# Patient Record
Sex: MALE | Race: WHITE | NOT HISPANIC OR LATINO | URBAN - METROPOLITAN AREA
[De-identification: names, ages, dates, MRNs, and addresses within clinical notes are randomized per-mention and may not be internally consistent; named-entity substitution may affect disease eponyms.]

---

## 2018-03-05 ENCOUNTER — EMERGENCY (EMERGENCY)
Facility: HOSPITAL | Age: 63
LOS: 1 days | Discharge: HOME | End: 2018-03-05
Attending: EMERGENCY MEDICINE | Admitting: EMERGENCY MEDICINE
Payer: COMMERCIAL

## 2018-03-05 VITALS
OXYGEN SATURATION: 95 % | SYSTOLIC BLOOD PRESSURE: 168 MMHG | HEART RATE: 88 BPM | WEIGHT: 315 LBS | HEIGHT: 70 IN | DIASTOLIC BLOOD PRESSURE: 103 MMHG | RESPIRATION RATE: 22 BRPM | TEMPERATURE: 99 F

## 2018-03-05 VITALS
OXYGEN SATURATION: 100 % | RESPIRATION RATE: 19 BRPM | TEMPERATURE: 98 F | DIASTOLIC BLOOD PRESSURE: 89 MMHG | HEART RATE: 77 BPM | SYSTOLIC BLOOD PRESSURE: 133 MMHG

## 2018-03-05 LAB
ALBUMIN SERPL ELPH-MCNC: 4.3 G/DL — SIGNIFICANT CHANGE UP (ref 3.3–5)
ALP SERPL-CCNC: 71 U/L — SIGNIFICANT CHANGE UP (ref 40–120)
ALT FLD-CCNC: 35 U/L RC — SIGNIFICANT CHANGE UP (ref 10–45)
ANION GAP SERPL CALC-SCNC: 13 MMOL/L — SIGNIFICANT CHANGE UP (ref 5–17)
AST SERPL-CCNC: 33 U/L — SIGNIFICANT CHANGE UP (ref 10–40)
BASOPHILS # BLD AUTO: 0.1 K/UL — SIGNIFICANT CHANGE UP (ref 0–0.2)
BASOPHILS NFR BLD AUTO: 0.5 % — SIGNIFICANT CHANGE UP (ref 0–2)
BILIRUB SERPL-MCNC: 0.5 MG/DL — SIGNIFICANT CHANGE UP (ref 0.2–1.2)
BUN SERPL-MCNC: 12 MG/DL — SIGNIFICANT CHANGE UP (ref 7–23)
CALCIUM SERPL-MCNC: 9.3 MG/DL — SIGNIFICANT CHANGE UP (ref 8.4–10.5)
CHLORIDE SERPL-SCNC: 103 MMOL/L — SIGNIFICANT CHANGE UP (ref 96–108)
CK MB BLD-MCNC: 1.6 % — SIGNIFICANT CHANGE UP (ref 0–3.5)
CK MB CFR SERPL CALC: 2.3 NG/ML — SIGNIFICANT CHANGE UP (ref 0–6.7)
CK SERPL-CCNC: 147 U/L — SIGNIFICANT CHANGE UP (ref 30–200)
CO2 SERPL-SCNC: 23 MMOL/L — SIGNIFICANT CHANGE UP (ref 22–31)
CREAT SERPL-MCNC: 0.82 MG/DL — SIGNIFICANT CHANGE UP (ref 0.5–1.3)
EOSINOPHIL # BLD AUTO: 0.1 K/UL — SIGNIFICANT CHANGE UP (ref 0–0.5)
EOSINOPHIL NFR BLD AUTO: 1.2 % — SIGNIFICANT CHANGE UP (ref 0–6)
GAS PNL BLDV: SIGNIFICANT CHANGE UP
GLUCOSE SERPL-MCNC: 155 MG/DL — HIGH (ref 70–99)
HCT VFR BLD CALC: 44.4 % — SIGNIFICANT CHANGE UP (ref 39–50)
HGB BLD-MCNC: 15.4 G/DL — SIGNIFICANT CHANGE UP (ref 13–17)
LYMPHOCYTES # BLD AUTO: 2.3 K/UL — SIGNIFICANT CHANGE UP (ref 1–3.3)
LYMPHOCYTES # BLD AUTO: 24.9 % — SIGNIFICANT CHANGE UP (ref 13–44)
MCHC RBC-ENTMCNC: 31.6 PG — SIGNIFICANT CHANGE UP (ref 27–34)
MCHC RBC-ENTMCNC: 34.7 GM/DL — SIGNIFICANT CHANGE UP (ref 32–36)
MCV RBC AUTO: 90.9 FL — SIGNIFICANT CHANGE UP (ref 80–100)
MONOCYTES # BLD AUTO: 0.7 K/UL — SIGNIFICANT CHANGE UP (ref 0–0.9)
MONOCYTES NFR BLD AUTO: 7.7 % — SIGNIFICANT CHANGE UP (ref 2–14)
NEUTROPHILS # BLD AUTO: 6.2 K/UL — SIGNIFICANT CHANGE UP (ref 1.8–7.4)
NEUTROPHILS NFR BLD AUTO: 65.7 % — SIGNIFICANT CHANGE UP (ref 43–77)
NT-PROBNP SERPL-SCNC: 96 PG/ML — SIGNIFICANT CHANGE UP (ref 0–300)
PLATELET # BLD AUTO: 256 K/UL — SIGNIFICANT CHANGE UP (ref 150–400)
POTASSIUM SERPL-MCNC: 4 MMOL/L — SIGNIFICANT CHANGE UP (ref 3.5–5.3)
POTASSIUM SERPL-SCNC: 4 MMOL/L — SIGNIFICANT CHANGE UP (ref 3.5–5.3)
PROT SERPL-MCNC: 7.7 G/DL — SIGNIFICANT CHANGE UP (ref 6–8.3)
RBC # BLD: 4.88 M/UL — SIGNIFICANT CHANGE UP (ref 4.2–5.8)
RBC # FLD: 12.1 % — SIGNIFICANT CHANGE UP (ref 10.3–14.5)
SODIUM SERPL-SCNC: 139 MMOL/L — SIGNIFICANT CHANGE UP (ref 135–145)
TROPONIN T SERPL-MCNC: <0.01 NG/ML — SIGNIFICANT CHANGE UP (ref 0–0.06)
TROPONIN T SERPL-MCNC: <0.01 NG/ML — SIGNIFICANT CHANGE UP (ref 0–0.06)
WBC # BLD: 9.4 K/UL — SIGNIFICANT CHANGE UP (ref 3.8–10.5)
WBC # FLD AUTO: 9.4 K/UL — SIGNIFICANT CHANGE UP (ref 3.8–10.5)

## 2018-03-05 PROCEDURE — 84295 ASSAY OF SERUM SODIUM: CPT

## 2018-03-05 PROCEDURE — 82553 CREATINE MB FRACTION: CPT

## 2018-03-05 PROCEDURE — 99053 MED SERV 10PM-8AM 24 HR FAC: CPT

## 2018-03-05 PROCEDURE — 84484 ASSAY OF TROPONIN QUANT: CPT

## 2018-03-05 PROCEDURE — 71046 X-RAY EXAM CHEST 2 VIEWS: CPT | Mod: 26

## 2018-03-05 PROCEDURE — 83880 ASSAY OF NATRIURETIC PEPTIDE: CPT

## 2018-03-05 PROCEDURE — 94640 AIRWAY INHALATION TREATMENT: CPT

## 2018-03-05 PROCEDURE — 71046 X-RAY EXAM CHEST 2 VIEWS: CPT

## 2018-03-05 PROCEDURE — 82550 ASSAY OF CK (CPK): CPT

## 2018-03-05 PROCEDURE — 99284 EMERGENCY DEPT VISIT MOD MDM: CPT | Mod: 25

## 2018-03-05 PROCEDURE — 82803 BLOOD GASES ANY COMBINATION: CPT

## 2018-03-05 PROCEDURE — 83605 ASSAY OF LACTIC ACID: CPT

## 2018-03-05 PROCEDURE — 82435 ASSAY OF BLOOD CHLORIDE: CPT

## 2018-03-05 PROCEDURE — 99285 EMERGENCY DEPT VISIT HI MDM: CPT | Mod: 25

## 2018-03-05 PROCEDURE — 80053 COMPREHEN METABOLIC PANEL: CPT

## 2018-03-05 PROCEDURE — 82947 ASSAY GLUCOSE BLOOD QUANT: CPT

## 2018-03-05 PROCEDURE — 93010 ELECTROCARDIOGRAM REPORT: CPT

## 2018-03-05 PROCEDURE — 96374 THER/PROPH/DIAG INJ IV PUSH: CPT

## 2018-03-05 PROCEDURE — 84132 ASSAY OF SERUM POTASSIUM: CPT

## 2018-03-05 PROCEDURE — 82330 ASSAY OF CALCIUM: CPT

## 2018-03-05 PROCEDURE — 85027 COMPLETE CBC AUTOMATED: CPT

## 2018-03-05 PROCEDURE — 93005 ELECTROCARDIOGRAM TRACING: CPT

## 2018-03-05 PROCEDURE — 85014 HEMATOCRIT: CPT

## 2018-03-05 RX ORDER — IPRATROPIUM/ALBUTEROL SULFATE 18-103MCG
3 AEROSOL WITH ADAPTER (GRAM) INHALATION ONCE
Qty: 0 | Refills: 0 | Status: COMPLETED | OUTPATIENT
Start: 2018-03-05 | End: 2018-03-05

## 2018-03-05 RX ORDER — TIOTROPIUM BROMIDE 18 UG/1
0 CAPSULE ORAL; RESPIRATORY (INHALATION)
Qty: 0 | Refills: 0 | COMMUNITY

## 2018-03-05 RX ORDER — FLUTICASONE PROPIONATE AND SALMETEROL 50; 250 UG/1; UG/1
0 POWDER ORAL; RESPIRATORY (INHALATION)
Qty: 0 | Refills: 0 | COMMUNITY

## 2018-03-05 RX ORDER — ASPIRIN/CALCIUM CARB/MAGNESIUM 324 MG
162 TABLET ORAL DAILY
Qty: 0 | Refills: 0 | Status: DISCONTINUED | OUTPATIENT
Start: 2018-03-05 | End: 2018-03-09

## 2018-03-05 RX ADMIN — Medication 3 MILLILITER(S): at 06:11

## 2018-03-05 RX ADMIN — Medication 162 MILLIGRAM(S): at 03:03

## 2018-03-05 RX ADMIN — Medication 125 MILLIGRAM(S): at 06:11

## 2018-03-05 RX ADMIN — Medication 3 MILLILITER(S): at 03:03

## 2018-03-05 NOTE — ED PROVIDER NOTE - PROGRESS NOTE DETAILS
PT seen and reassessed.  Patient symptomatically improved.   AAOX3, NAD, VSS.  Discussed test results w/ patient, given copy of results. Patient verbalized understanding of hospital course and outpatient plans, has decisional making capacity. Discussed possibiity of new onset Heart Failure with patient and possible need for echo vs stress test. patient does not wish to stay and requests close outpatient follow up. he understands the risk of deterioration including worsening shortness of breath or death and wishes to go. has decision making capacity and full understanding.

## 2018-03-05 NOTE — ED PROVIDER NOTE - ATTENDING CONTRIBUTION TO CARE
I have examined and evaluated this patient with the above resident or PA, and agree with the documented clinical history, exam and plan.   Briefly: 62yo M with h/o Asthma, Chronic bronchitis 2/2 asbestos exposure, HTN, presenting with acute on chronic dyspnea, worse with exertion/activity.  On exam, afebrile, in NAD, obese male; speaking in full sentences, no respiratory distress.  Lungs have diffuse expiratory wheezing.  Cardiac: nrl s1/s2 RRR no MGR.  Abd soft nt/nd.  No peripheral edema.  No rash.      ECG NSR without ischemic changes.  CXR obtained, no consolidation or ptx.  Labs obtained, trop and pro-bnp negative.  Likely dc with nebs, steroids if repeat trop negative.

## 2018-03-05 NOTE — ED PROVIDER NOTE - PLAN OF CARE
1) Follow up with Cardiology in the next 2-5 days. Call today for an appointment.   2) Return to the ER immediately for new or worsening symptoms including worsening shortness of breath, chest pain or other issuess.   3) Take the steroid pack as prescribed .  4) Use your home albuterol every 4 hours as needed for wheezing or shortness of breath.   5) Continue your other home medications.

## 2018-03-05 NOTE — ED ADULT NURSE NOTE - OBJECTIVE STATEMENT
63 year old male alert and oriented x 4 came to the ED c/o shortness of breath for the past 2-3 days which got worse today.  Patient says shortness of breath si worse when laying flat and he started taking an albuterol nebulizer yesterday and today, last dose 2230 tonight which didn't really help his symptoms.  Patient denies; chest pain, fevers, chills, nausea, vomiting, recent travel, sick contacts, smoking, cough or leg swelling.  Patient has some shortness of breath noted while speaking and l/s are clear in the upper lobes and diminished b/l, equal and symmetrical chest rise noted, S1, S2 auscultated.  Patient has h/o asbestos exposure and takes Advair and Spiriva regularly which he has been taking as prescribed.  IV established in the right AC #20  Safety ensured.

## 2018-03-05 NOTE — ED PROVIDER NOTE - MEDICAL DECISION MAKING DETAILS
63yoM obese, htn, hld, asthma pw shortness of breath. +orthopnea, balderas, pno,. possible asthma exacerbation but no wheezing. concern for acs vs new onset chf. will send labs, trops, bnp, ekg, cxr, and liekly admit for tele.

## 2018-03-05 NOTE — ED PROVIDER NOTE - CARE PLAN
Principal Discharge DX:	Mild intermittent asthma with acute exacerbation  Assessment and plan of treatment:	1) Follow up with Cardiology in the next 2-5 days. Call today for an appointment.   2) Return to the ER immediately for new or worsening symptoms including worsening shortness of breath, chest pain or other issuess.   3) Take the steroid pack as prescribed .  4) Use your home albuterol every 4 hours as needed for wheezing or shortness of breath.   5) Continue your other home medications.

## 2018-03-05 NOTE — ED PROVIDER NOTE - OBJECTIVE STATEMENT
64yo M hx of asbestos exposure, asthma, htn pw shortness of breath, for past 2 days, worse with laying down, better with sitting up, better with albuterol, home inhalor per patient. sob worse with walking. patient able to walk a block at baseline, now only can walk half a block and feels sob.   denies coughing, chest pain, abd pain, nausea, vomiting, diarrhea, weakness, numbness, tingling or other issues.

## 2018-11-14 ENCOUNTER — APPOINTMENT (OUTPATIENT)
Dept: ORTHOPEDIC SURGERY | Facility: CLINIC | Age: 63
End: 2018-11-14
Payer: COMMERCIAL

## 2018-11-14 VITALS
WEIGHT: 315 LBS | DIASTOLIC BLOOD PRESSURE: 73 MMHG | SYSTOLIC BLOOD PRESSURE: 103 MMHG | HEIGHT: 70 IN | BODY MASS INDEX: 45.1 KG/M2 | HEART RATE: 98 BPM

## 2018-11-14 DIAGNOSIS — M17.12 UNILATERAL PRIMARY OSTEOARTHRITIS, LEFT KNEE: ICD-10-CM

## 2018-11-14 DIAGNOSIS — M16.12 UNILATERAL PRIMARY OSTEOARTHRITIS, LEFT HIP: ICD-10-CM

## 2018-11-14 DIAGNOSIS — M16.11 UNILATERAL PRIMARY OSTEOARTHRITIS, RIGHT HIP: ICD-10-CM

## 2018-11-14 DIAGNOSIS — M17.11 UNILATERAL PRIMARY OSTEOARTHRITIS, RIGHT KNEE: ICD-10-CM

## 2018-11-14 PROCEDURE — 73502 X-RAY EXAM HIP UNI 2-3 VIEWS: CPT | Mod: LT

## 2018-11-14 PROCEDURE — 73564 X-RAY EXAM KNEE 4 OR MORE: CPT | Mod: LT

## 2018-11-14 PROCEDURE — 99204 OFFICE O/P NEW MOD 45 MIN: CPT

## 2019-09-19 ENCOUNTER — EMERGENCY (EMERGENCY)
Facility: HOSPITAL | Age: 64
LOS: 1 days | Discharge: HOME | End: 2019-09-19
Attending: EMERGENCY MEDICINE
Payer: COMMERCIAL

## 2019-09-19 VITALS
OXYGEN SATURATION: 95 % | DIASTOLIC BLOOD PRESSURE: 83 MMHG | RESPIRATION RATE: 16 BRPM | HEART RATE: 88 BPM | TEMPERATURE: 97 F | SYSTOLIC BLOOD PRESSURE: 152 MMHG

## 2019-09-19 VITALS
RESPIRATION RATE: 17 BRPM | HEART RATE: 125 BPM | TEMPERATURE: 98 F | HEIGHT: 70 IN | WEIGHT: 307.99 LBS | DIASTOLIC BLOOD PRESSURE: 94 MMHG | OXYGEN SATURATION: 96 % | SYSTOLIC BLOOD PRESSURE: 161 MMHG

## 2019-09-19 LAB
ALBUMIN SERPL ELPH-MCNC: 4.8 G/DL — SIGNIFICANT CHANGE UP (ref 3.3–5)
ALP SERPL-CCNC: 86 U/L — SIGNIFICANT CHANGE UP (ref 40–120)
ALT FLD-CCNC: 16 U/L — SIGNIFICANT CHANGE UP (ref 10–45)
ANION GAP SERPL CALC-SCNC: 16 MMOL/L — SIGNIFICANT CHANGE UP (ref 5–17)
APPEARANCE UR: ABNORMAL
AST SERPL-CCNC: 18 U/L — SIGNIFICANT CHANGE UP (ref 10–40)
BACTERIA # UR AUTO: ABNORMAL
BASOPHILS # BLD AUTO: 0 K/UL — SIGNIFICANT CHANGE UP (ref 0–0.2)
BASOPHILS NFR BLD AUTO: 0.2 % — SIGNIFICANT CHANGE UP (ref 0–2)
BILIRUB SERPL-MCNC: 1 MG/DL — SIGNIFICANT CHANGE UP (ref 0.2–1.2)
BILIRUB UR-MCNC: NEGATIVE — SIGNIFICANT CHANGE UP
BUN SERPL-MCNC: 20 MG/DL — SIGNIFICANT CHANGE UP (ref 7–23)
CALCIUM SERPL-MCNC: 10.4 MG/DL — SIGNIFICANT CHANGE UP (ref 8.4–10.5)
CHLORIDE SERPL-SCNC: 104 MMOL/L — SIGNIFICANT CHANGE UP (ref 96–108)
CO2 SERPL-SCNC: 20 MMOL/L — LOW (ref 22–31)
COD CRY URNS QL: ABNORMAL
COLOR SPEC: ABNORMAL
CREAT SERPL-MCNC: 0.94 MG/DL — SIGNIFICANT CHANGE UP (ref 0.5–1.3)
DIFF PNL FLD: ABNORMAL
EOSINOPHIL # BLD AUTO: 0.1 K/UL — SIGNIFICANT CHANGE UP (ref 0–0.5)
EOSINOPHIL NFR BLD AUTO: 0.4 % — SIGNIFICANT CHANGE UP (ref 0–6)
EPI CELLS # UR: 3 /HPF — SIGNIFICANT CHANGE UP
GAS PNL BLDV: SIGNIFICANT CHANGE UP
GLUCOSE SERPL-MCNC: 207 MG/DL — HIGH (ref 70–99)
GLUCOSE UR QL: NEGATIVE — SIGNIFICANT CHANGE UP
HCT VFR BLD CALC: 44.9 % — SIGNIFICANT CHANGE UP (ref 39–50)
HGB BLD-MCNC: 14.6 G/DL — SIGNIFICANT CHANGE UP (ref 13–17)
HYALINE CASTS # UR AUTO: 2 /LPF — SIGNIFICANT CHANGE UP (ref 0–7)
KETONES UR-MCNC: SIGNIFICANT CHANGE UP
LEUKOCYTE ESTERASE UR-ACNC: ABNORMAL
LIDOCAIN IGE QN: 26 U/L — SIGNIFICANT CHANGE UP (ref 7–60)
LYMPHOCYTES # BLD AUTO: 1.1 K/UL — SIGNIFICANT CHANGE UP (ref 1–3.3)
LYMPHOCYTES # BLD AUTO: 7.7 % — LOW (ref 13–44)
MCHC RBC-ENTMCNC: 29.4 PG — SIGNIFICANT CHANGE UP (ref 27–34)
MCHC RBC-ENTMCNC: 32.5 GM/DL — SIGNIFICANT CHANGE UP (ref 32–36)
MCV RBC AUTO: 90.4 FL — SIGNIFICANT CHANGE UP (ref 80–100)
MONOCYTES # BLD AUTO: 0.9 K/UL — SIGNIFICANT CHANGE UP (ref 0–0.9)
MONOCYTES NFR BLD AUTO: 6.1 % — SIGNIFICANT CHANGE UP (ref 2–14)
NEUTROPHILS # BLD AUTO: 12.7 K/UL — HIGH (ref 1.8–7.4)
NEUTROPHILS NFR BLD AUTO: 85.6 % — HIGH (ref 43–77)
NITRITE UR-MCNC: POSITIVE
PH UR: 8 — SIGNIFICANT CHANGE UP (ref 5–8)
PLATELET # BLD AUTO: 270 K/UL — SIGNIFICANT CHANGE UP (ref 150–400)
POTASSIUM SERPL-MCNC: 4.2 MMOL/L — SIGNIFICANT CHANGE UP (ref 3.5–5.3)
POTASSIUM SERPL-SCNC: 4.2 MMOL/L — SIGNIFICANT CHANGE UP (ref 3.5–5.3)
PROT SERPL-MCNC: 8.1 G/DL — SIGNIFICANT CHANGE UP (ref 6–8.3)
PROT UR-MCNC: >600
RBC # BLD: 4.97 M/UL — SIGNIFICANT CHANGE UP (ref 4.2–5.8)
RBC # FLD: 12.3 % — SIGNIFICANT CHANGE UP (ref 10.3–14.5)
RBC CASTS # UR COMP ASSIST: 8 /HPF — HIGH (ref 0–4)
SODIUM SERPL-SCNC: 140 MMOL/L — SIGNIFICANT CHANGE UP (ref 135–145)
SP GR SPEC: 1.01 — SIGNIFICANT CHANGE UP (ref 1.01–1.02)
TRI-PHOS CRY UR QL COMP ASSIST: ABNORMAL
TROPONIN T, HIGH SENSITIVITY RESULT: 10 NG/L — SIGNIFICANT CHANGE UP (ref 0–51)
TROPONIN T, HIGH SENSITIVITY RESULT: 11 NG/L — SIGNIFICANT CHANGE UP (ref 0–51)
UROBILINOGEN FLD QL: NEGATIVE — SIGNIFICANT CHANGE UP
WBC # BLD: 14.8 K/UL — HIGH (ref 3.8–10.5)
WBC # FLD AUTO: 14.8 K/UL — HIGH (ref 3.8–10.5)
WBC UR QL: 105 /HPF — HIGH (ref 0–5)

## 2019-09-19 PROCEDURE — 80053 COMPREHEN METABOLIC PANEL: CPT

## 2019-09-19 PROCEDURE — 82435 ASSAY OF BLOOD CHLORIDE: CPT

## 2019-09-19 PROCEDURE — 96375 TX/PRO/DX INJ NEW DRUG ADDON: CPT

## 2019-09-19 PROCEDURE — 93010 ELECTROCARDIOGRAM REPORT: CPT

## 2019-09-19 PROCEDURE — 83690 ASSAY OF LIPASE: CPT

## 2019-09-19 PROCEDURE — 85027 COMPLETE CBC AUTOMATED: CPT

## 2019-09-19 PROCEDURE — 74177 CT ABD & PELVIS W/CONTRAST: CPT

## 2019-09-19 PROCEDURE — 74177 CT ABD & PELVIS W/CONTRAST: CPT | Mod: 26

## 2019-09-19 PROCEDURE — 84295 ASSAY OF SERUM SODIUM: CPT

## 2019-09-19 PROCEDURE — 99285 EMERGENCY DEPT VISIT HI MDM: CPT

## 2019-09-19 PROCEDURE — 99284 EMERGENCY DEPT VISIT MOD MDM: CPT | Mod: 25

## 2019-09-19 PROCEDURE — 85014 HEMATOCRIT: CPT

## 2019-09-19 PROCEDURE — 82947 ASSAY GLUCOSE BLOOD QUANT: CPT

## 2019-09-19 PROCEDURE — 96361 HYDRATE IV INFUSION ADD-ON: CPT

## 2019-09-19 PROCEDURE — 81001 URINALYSIS AUTO W/SCOPE: CPT

## 2019-09-19 PROCEDURE — 82803 BLOOD GASES ANY COMBINATION: CPT

## 2019-09-19 PROCEDURE — 96365 THER/PROPH/DIAG IV INF INIT: CPT | Mod: XU

## 2019-09-19 PROCEDURE — 84484 ASSAY OF TROPONIN QUANT: CPT

## 2019-09-19 PROCEDURE — 83605 ASSAY OF LACTIC ACID: CPT

## 2019-09-19 PROCEDURE — 84132 ASSAY OF SERUM POTASSIUM: CPT

## 2019-09-19 PROCEDURE — 93005 ELECTROCARDIOGRAM TRACING: CPT

## 2019-09-19 PROCEDURE — 82330 ASSAY OF CALCIUM: CPT

## 2019-09-19 RX ORDER — PHENAZOPYRIDINE HCL 100 MG
200 TABLET ORAL ONCE
Refills: 0 | Status: COMPLETED | OUTPATIENT
Start: 2019-09-19 | End: 2019-09-19

## 2019-09-19 RX ORDER — MORPHINE SULFATE 50 MG/1
4 CAPSULE, EXTENDED RELEASE ORAL ONCE
Refills: 0 | Status: DISCONTINUED | OUTPATIENT
Start: 2019-09-19 | End: 2019-09-19

## 2019-09-19 RX ORDER — ACETAMINOPHEN 500 MG
975 TABLET ORAL ONCE
Refills: 0 | Status: COMPLETED | OUTPATIENT
Start: 2019-09-19 | End: 2019-09-19

## 2019-09-19 RX ORDER — CEFPODOXIME PROXETIL 100 MG
1 TABLET ORAL
Qty: 14 | Refills: 0
Start: 2019-09-19 | End: 2019-09-25

## 2019-09-19 RX ORDER — SODIUM CHLORIDE 9 MG/ML
1000 INJECTION INTRAMUSCULAR; INTRAVENOUS; SUBCUTANEOUS ONCE
Refills: 0 | Status: COMPLETED | OUTPATIENT
Start: 2019-09-19 | End: 2019-09-19

## 2019-09-19 RX ORDER — CEFTRIAXONE 500 MG/1
1000 INJECTION, POWDER, FOR SOLUTION INTRAMUSCULAR; INTRAVENOUS ONCE
Refills: 0 | Status: COMPLETED | OUTPATIENT
Start: 2019-09-19 | End: 2019-09-19

## 2019-09-19 RX ORDER — LACTULOSE 10 G/15ML
10 SOLUTION ORAL
Qty: 30 | Refills: 0
Start: 2019-09-19 | End: 2019-09-21

## 2019-09-19 RX ORDER — PHENAZOPYRIDINE HCL 100 MG
1 TABLET ORAL
Qty: 6 | Refills: 0
Start: 2019-09-19 | End: 2019-09-20

## 2019-09-19 RX ORDER — ONDANSETRON 8 MG/1
4 TABLET, FILM COATED ORAL ONCE
Refills: 0 | Status: COMPLETED | OUTPATIENT
Start: 2019-09-19 | End: 2019-09-19

## 2019-09-19 RX ADMIN — SODIUM CHLORIDE 1000 MILLILITER(S): 9 INJECTION INTRAMUSCULAR; INTRAVENOUS; SUBCUTANEOUS at 13:38

## 2019-09-19 RX ADMIN — SODIUM CHLORIDE 1000 MILLILITER(S): 9 INJECTION INTRAMUSCULAR; INTRAVENOUS; SUBCUTANEOUS at 11:25

## 2019-09-19 RX ADMIN — ONDANSETRON 4 MILLIGRAM(S): 8 TABLET, FILM COATED ORAL at 11:25

## 2019-09-19 RX ADMIN — Medication 1 ENEMA: at 12:25

## 2019-09-19 RX ADMIN — MORPHINE SULFATE 4 MILLIGRAM(S): 50 CAPSULE, EXTENDED RELEASE ORAL at 11:25

## 2019-09-19 RX ADMIN — Medication 200 MILLIGRAM(S): at 18:14

## 2019-09-19 RX ADMIN — MORPHINE SULFATE 4 MILLIGRAM(S): 50 CAPSULE, EXTENDED RELEASE ORAL at 12:37

## 2019-09-19 RX ADMIN — Medication 975 MILLIGRAM(S): at 18:14

## 2019-09-19 RX ADMIN — CEFTRIAXONE 100 MILLIGRAM(S): 500 INJECTION, POWDER, FOR SOLUTION INTRAMUSCULAR; INTRAVENOUS at 15:41

## 2019-09-19 RX ADMIN — CEFTRIAXONE 1000 MILLIGRAM(S): 500 INJECTION, POWDER, FOR SOLUTION INTRAMUSCULAR; INTRAVENOUS at 16:45

## 2019-09-19 NOTE — ED PROVIDER NOTE - ATTENDING CONTRIBUTION TO CARE
I, Ike Aguirre, performed a history and physical exam of the patient and discussed their management with the resident and /or advanced care provider. I reviewed the resident and /or ACP's note and agree with the documented findings and plan of care. I was present and available for all procedures.  Patient feels improved in ED.  Patient with history of abd surgeries and reports constipation.  Evaluated for obstruction vs constipation.  Awaiting CT read, will treat UTI.  Patient signed-out to Dr. Worthy to f/u CT read.

## 2019-09-19 NOTE — ED PROVIDER NOTE - NSFOLLOWUPCLINICS_GEN_ALL_ED_FT
[facility] - Urology  Urology  [address], [address] [place] [address]  Phone: [phone]  Fax:   Follow Up Time: 1-3 Days

## 2019-09-19 NOTE — ED PROVIDER NOTE - PROGRESS NOTE DETAILS
On reassessment, pt states enema has helped a small amount of stool pass but not nearly what would be expected. Also has had trouble urinating while in ED, only had small amount of urine in cup. Will bladder scan and straight cath Joanna Reed MD PGY-2 pt sent home with abx and pyridium for pain. also with lactulose for constipation.  will give uro follow up

## 2019-09-19 NOTE — ED PROVIDER NOTE - NSFOLLOWUPINSTRUCTIONS_ED_ALL_ED_FT
1. You were seen in the Emergency Room for urinary tract infection. You were not found to have a small bowel obstruction.   2. Please take all medications as prescribed.   3. Please follow up with your doctor in 24-48 hours. You should also follow up with a urologist, referral was provided to you  4. Return to the emergency room or seek immediate assistance for any new or concerning symptoms (such as fevers, chills, worsening abdominal pain, nausea, vomiting, continued constipation, blood in stool, blood in vomit), or if you get worse.   5. Copies of your tests were provided to you for follow-up.  You must address all your findings with your doctor.

## 2019-09-19 NOTE — ED ADULT NURSE NOTE - OBJECTIVE STATEMENT
64 year old male Hx of previous brain surgery, ambulates with a cane presents tot he ED complaining of several days of constipation, suprapubic pain, and burning with urination that started last night. Pt is A&O x 4, VSS, afebrile, ambulating independently. Pt denies fever, chills, NVD, SOB, or chest pain. IV placed, labs drawn, bed in low position, safety measures in place. Abdomen 64 year old male Hx of previous brain surgery, ambulates with a cane presents tot he ED complaining of several days of constipation, suprapubic pain, and burning with urination that started last night. Pt is A&O x 4, VSS, afebrile, ambulating independently. Pt denies fever, chills, NVD, SOB, or chest pain. IV placed, labs drawn, bed in low position, safety measures in place. Abdomen soft and tender to palpation. Pt uncomfortable in appearance.

## 2019-09-19 NOTE — ED PROVIDER NOTE - PATIENT PORTAL LINK FT
You can access the FollowMyHealth Patient Portal offered by [facility] by registering at the following website: [email or web address] By joining [facility]’s FollowMyHealth portal, you will also be able to view your health information using other applications (apps) compatible with our system.

## 2019-09-19 NOTE — ED PROVIDER NOTE - CLINICAL SUMMARY MEDICAL DECISION MAKING FREE TEXT BOX
Joanna Reed MD PGY-2 pt is a 63 yo M with PMH HTN, DBS for tremors., chronic bronchitis, with multiple abdominal surgeries (hernia repair, appendectomy, "stomach closed at one end as child") p/w constipation x 1 week and now with dysuria and urinary hesitancy. Concern for SBO given abdominal surgeries, pain/discomfort and constipation. Also concern for cardiac given nausea, diaphoresis, obesity and abdominal pain (may be referred cardiac) - will r/o NSTEMI vs STEMI with EKG and trop. will get labs, , pain control, antiemetics, UA, CT, reassess

## 2019-09-19 NOTE — ED PROVIDER NOTE - OBJECTIVE STATEMENT
Joanna Reed MD PGY-2 pt is a 65 yo M with PMH HTN, DBS for tremors., chronic bronchitis, with multiple abdominal surgeries (hernia repair, appendectomy, "stomach closed at one end as child") p/w constipation x 1 week. Patient states that he has tried OTC stool softeners, and has had passage of small stool this morning. This AM awoke with dysuria and hesitancy. +passage of flatus, +nausea

## 2019-09-19 NOTE — ED PROVIDER NOTE - PHYSICAL EXAMINATION
PHYSICAL EXAM:   General: appears uncomfortable, diaphoretic  HEENT: NC/AT,  airway patent  Cardiovascular: tachcyardic rate and rhythm, + S1/S2,   Respiratory: clear to auscultation bilaterally, good aeration bilaterally, nonlabored respirations  Abdominal: soft, obese abdomen, +b/l lower abdominal and suprapubic ttp, no rebound, guarding or rigidity  Back: no rashes noted  Neuro: Nonfocal  Psychiatric: appropriate mood and affect.   Skin: as above, diaphoretic  -Joanna Reed PGY-2

## 2023-08-10 NOTE — ED ADULT NURSE NOTE - CAS TRG GEN SKIN CONDITION
Physical Therapy Visit    Visit Type: Daily Treatment Note  Visit: 2  Referring Provider: Jose Alberto Mckeon PA-C  Medical Diagnosis (from order): Diagnosis Information    Diagnosis  715.16 (ICD-9-CM) - M17.12 (ICD-10-CM) - Unilateral primary osteoarthritis, left knee       Patient alert and oriented X3.    SUBJECTIVE                                                                                                               The gel shots have helped but I do always have pain     Pain / Symptoms  - Pain rating (out of 10): Current: 3       OBJECTIVE                                                                                                                     Strength  (out of 5 unless noted, standard test position unless noted)   Knee:    - Flexion:        • Left: 4-  Ankle:    - Plantar Flexion:        • Left: 3-                       Treatment     Therapeutic Exercise  Recumbent bike, on seat 13 x 10 min for knee range of motion  Leg press 55# x30  Single leg , leg press 22# x20  (can do 33# but with pain)  HEP review:  - Figure 4 Bridge - 3 sets - 10 reps - bilaterally   - Prone Quadriceps Stretch with Strap - 3 reps - 30 hold  - Hip Abduction with green theraband around ankles bilaterally - 3 sets - 10 reps  - Staggered Sit-to-Stand  - 3 sets - 10 reps (left posterior)      Skilled input: verbal instruction/cues, tactile instruction/cues, facilitation, demonstration and inhibition    Writer verbally educated and received verbal consent for hand placement, positioning of patient, and techniques to be performed today from patient for therapist position for techniques and hand placement and palpation for techniques as described above and how they are pertinent to the patient's plan of care.  Home Exercise Program  Access Code: [email or web address]  URL: [email or web address]  Date: 07/11/2023  Prepared by: Rommel Craig    Exercises  - Figure 4 Bridge  - 1 x daily - 7 x weekly - 3 sets - 10 reps  -  Prone Quadriceps Stretch with Strap  - 1 x daily - 7 x weekly - 3 sets - 1 reps - 30 hold  - Hip Abduction with Resistance Loop  - 1 x daily - 7 x weekly - 3 sets - 10 reps  - Staggered Sit-to-Stand  - 1 x daily - 7 x weekly - 3 sets - 10 reps          ASSESSMENT                                                                                                            First session s/p initial evaluation. Reviewed initial HEP. Cued and corrected for optimal form as needed. Patient is compliant as evident by demonstration. Educated patient on arthritis and joint protection with heavy work. Skilled therapy is warranted to improve upon functional activities such as walking, stairs, and squatting.    ISarah PTA, provided services under the direct supervision of Rommel Craig PT.  Education:   - Present and ready to learn: patient  - Results of above outlined education: Verbalizes understanding and Demonstrates understanding    PLAN                                                                                                                           Suggestions for next session as indicated: Progress per plan of care       Therapy procedure time and total treatment time can be found documented on the Time Entry flowsheet     Dry/Warm

## 2025-06-25 NOTE — ED PROVIDER NOTE - SKIN, MLM
I called patient, no answer, I left a message on her VM that per Amira, Please let patient know that abdominal US was notable for fatty infilitration of the liver. At this time, I would have her initiate diet and exercise regimen in order to promote healthy weight loss as this is essentially the only way to prevent progression of disease to cirrhosis.     Skin normal color for race, warm, dry and intact. No evidence of rash.